# Patient Record
Sex: FEMALE | Race: OTHER | HISPANIC OR LATINO | URBAN - METROPOLITAN AREA
[De-identification: names, ages, dates, MRNs, and addresses within clinical notes are randomized per-mention and may not be internally consistent; named-entity substitution may affect disease eponyms.]

---

## 2021-07-15 ENCOUNTER — EMERGENCY (EMERGENCY)
Facility: HOSPITAL | Age: 36
LOS: 1 days | Discharge: HOME | End: 2021-07-15
Attending: EMERGENCY MEDICINE | Admitting: EMERGENCY MEDICINE
Payer: MEDICAID

## 2021-07-15 VITALS
RESPIRATION RATE: 19 BRPM | DIASTOLIC BLOOD PRESSURE: 81 MMHG | OXYGEN SATURATION: 97 % | HEART RATE: 85 BPM | TEMPERATURE: 98 F | SYSTOLIC BLOOD PRESSURE: 128 MMHG

## 2021-07-15 VITALS
RESPIRATION RATE: 18 BRPM | OXYGEN SATURATION: 99 % | SYSTOLIC BLOOD PRESSURE: 134 MMHG | HEART RATE: 90 BPM | DIASTOLIC BLOOD PRESSURE: 80 MMHG | WEIGHT: 139.99 LBS | TEMPERATURE: 98 F | HEIGHT: 61 IN

## 2021-07-15 PROCEDURE — 99284 EMERGENCY DEPT VISIT MOD MDM: CPT

## 2021-07-15 PROCEDURE — 73030 X-RAY EXAM OF SHOULDER: CPT

## 2021-07-15 PROCEDURE — 99283 EMERGENCY DEPT VISIT LOW MDM: CPT | Mod: 25

## 2021-07-15 PROCEDURE — 73030 X-RAY EXAM OF SHOULDER: CPT | Mod: 26,RT

## 2021-07-15 RX ORDER — IBUPROFEN 200 MG
600 TABLET ORAL ONCE
Refills: 0 | Status: COMPLETED | OUTPATIENT
Start: 2021-07-15 | End: 2021-07-15

## 2021-07-15 RX ADMIN — Medication 600 MILLIGRAM(S): at 18:07

## 2021-07-15 RX ADMIN — Medication 600 MILLIGRAM(S): at 18:48

## 2021-07-15 NOTE — ED ADULT NURSE NOTE - OBJECTIVE STATEMENT
Pt. received alert and oriented x3 with chief complaint of right shoulder pain after lifting box at work 1 day ago.

## 2021-07-15 NOTE — ED PROVIDER NOTE - ATTENDING CONTRIBUTION TO CARE
35 y/o F with c/o shoulder pain after lifting a heavy box of fries yesterday while at work    PE: unremarkable, FROM    xray, pain control 35 y/o F with c/o right shoulder pain after lifting a heavy box of fries yesterday while at work    PE: unremarkable, FROM    xray, pain control

## 2021-07-15 NOTE — ED PROVIDER NOTE - CARE PROVIDER_API CALL
Hal Rios  ORTHOPAEDIC SURGERY  [address] [place] [address]  Phone: [phone]  Fax: [phone]  Follow Up Time: 1-3 Days

## 2021-07-15 NOTE — ED PROVIDER NOTE - SKIN, MLM
Skin normal color for race, warm, dry and intact. No evidence of rash. no abrasions, lacerations or ecchymosis to right shoulder. cap refill < 2sec

## 2021-07-15 NOTE — ED PROVIDER NOTE - NSFOLLOWUPINSTRUCTIONS_ED_ALL_ED_FT
Esguince de hombro    LO QUE NECESITA SABER:    ¿Qué es un esguince de hombro?Se produce un esguince de hombro cuando se estira o se desgarra un ligamento del hombro. Los ligamentos son los tejidos resistentes que conectan los huesos. Los ligamentos permiten alzar, bajar y hacer girar el brazo.    Anatomía del hombro         ¿Cuáles son los signos y síntomas de un esguince de hombro?  •Moretones o cambios en el color de la piel      •Dolor y rigidez, sobre todo con el movimiento      •Inflamación y sensibilidad      ¿Cómo se diagnostica un esguince de hombro?Montiel médico le preguntará acerca de montiel lesión y lo examinará. Infórmele si usted escuchó un crujido o estallido cuando se lesionó. Montiel médico revisará el movimiento y la fuerza de montiel articulación. Es posible que le pida que usted mismo mueva la articulación. Es posible que también necesite alguno de los siguientes tratamientos:   •Dipesh radiografía, tomografía computarizada o resonancia magnéticapodrían mostrar el esguince u otros daños. Es posible que le den líquido de contraste para que montiel lesión se willy mejor en las imágenes. Dígale al médico si usted alguna vez ha tenido dipesh reacción alérgica al líquido de contraste. No entre a la [place] donde se realiza la resonancia magnética con algo de metal. El metal puede causar lesiones serias. Dígale al médico si usted tiene algo de metal dentro de montiel cuerpo o por encima.      •Dipesh artroscopiaes un procedimiento para philip dentro de la articulación. Montiel médico realiza dipesh incisión pequeña en montiel articulación e introduce un endoscopio a través de trenton. El endoscopio es un tubo marita con dipesh lupa, dipesh cámara y dipesh micah en un extremo.      ¿Cómo se trata un esguince de hombro?El tratamiento depende de la gravedad de montiel lesión y de cuándo ocurrió. Es posible que usted necesite alguno de los siguientes:  •Un cabestrillopodría ser necesario. Un cabestrillo limitará el movimiento del brazo y protegerá la articulación del hombro.   Cabestrillo para hombro           •Acetaminofénalivia el dolor y baja la fiebre. Está disponible sin receta médica. Pregunte la cantidad y la frecuencia con que debe tomarlos. Siga las indicaciones. Lino las etiquetas de todos los demás medicamentos que esté usando para saber si también contienen acetaminofén, o pregunte a montiel médico o farmacéutico. El acetaminofén puede causar daño en el hígado cuando no se gregg de forma correcta. No use más de 4 gramos (4000 miligramos) en total de acetaminofeno en un día.      •Los MADI,[place] el ibuprofeno, ayudan a disminuir la inflamación, el dolor y la fiebre. Kasandra medicamento está disponible con o sin dipesh receta médica. Los MADI pueden causar sangrado estomacal o problemas renales en ciertas personas. Si usted gregg un medicamento anticoagulante, siempre pregúntele a montiel médico si los MADI son seguros para usted. Siempre lino la etiqueta de kasandra medicamento y siga las instrucciones.      •Puede administrarsepodrían administrarse. Pregunte al médico cómo debe arielle kasandra medicamento de forma delgadillo. Algunos medicamentos recetados para el dolor contienen acetaminofén. No tome otros medicamentos que contengan acetaminofén sin consultarlo con montiel médico. Demasiado acetaminofeno puede causar daño al hígado. Los medicamentos recetados para el dolor podrían causar estreñimiento. Pregunte a montiel médico [place] prevenir o tratar estreñimiento.      •La fisioterapiapodría ser recomendado por montiel médico. Un fisioterapeuta le puede enseñar ejercicios para ayudarle a mejorar el movimiento y la fuerza, y para disminuir el dolor.      •La cirugíapodría ser necesaria para reparar o reemplazar un ligamento desgarrado si la torcedura no se recupera con otros tratamientos.      ¿Cómo puedo manejar el esguince de hombro?  •El descansosu hombro para que pueda sanar. Evite  el hombro mientras que montiel lesión mango. [place] ayudará a disminuir el riesgo de que se darron incluso más daño en el hombro.      •Aplique hieloen el hombro de 20 a 30 minutos cada 2 horas o [place] se lo indiquen. Use dipesh compresa de hielo o ponga hielo triturado en dipesh bolsa de plástico. Cúbrala con dipesh toalla antes de aplicarla sobre el hombro. El hielo ayuda a evitar daño al tejido y a disminuir la inflamación y el dolor.      •Compresiónsu hombro según indicaciones. La compresión (presión hugo) shraddha apoyo y contribuye a bajar la inflamación y limitar el movimiento para que montiel hombro pueda sanar.      ¿Cómo puedo evitar el esguince de hombro?  •No darron ejercicio cuando esté cansado o con dolor. Entre en calor y estírese antes de hacer ejercicio.      •Use equipo de protección cuando practique deportes.      •Use zapatos que le calcen dalia y corra sobre superficies che para no caerse.      ¿Cuándo ashlee buscar atención inmediata?  •Siente dolor intenso en el hombro cuando lo mueve o se lo toca.      •Montiel piel se siente fría o húmeda al tacto.      •Tiene entumecimiento, sensación de hormigueo o sensación de cosquilleo en el hombro.      •La piel del hombro lesionado se pone de color fauzia o pálida.      ¿Cuándo ashlee llamar a mi médico?  •Siente más dolor o se le hincha más el hombro, o comienza a tener dolor o hinchazón.      •Siente más rigidez o comienza a sentir rigidez cuando mueve el hombro lesionado.      •Anisha síntomas no mejoran dentro de 5 a 7 días.      •Usted tiene preguntas o inquietudes acerca de montiel condición o cuidado.      ACUERDOS SOBRE MONTIEL CUIDADO:    Usted tiene el derecho de ayudar a planear montiel cuidado. Aprenda todo lo que pueda sobre montiel condición y [place] darle tratamiento. Discuta anisha opciones de tratamiento con anisha médicos para decidir el cuidado que usted desea recibir. Usted siempre tiene el derecho de rechazar el tratamiento.

## 2021-07-15 NOTE — ED PROVIDER NOTE - MUSCULOSKELETAL MINIMAL EXAM
+ TTP right anterior shoulder with decreased ROm. no deformity. no TTP right elbow or wrist. radial pulses equal and intact bilaterally.

## 2021-07-15 NOTE — ED PROVIDER NOTE - PATIENT PORTAL LINK FT
You can access the FollowMyHealth Patient Portal offered by [facility] by registering at the following website: [email or web address]. By joining [facility]’s FollowMyHealth portal, you will also be able to view your health information using other applications (apps) compatible with our system.

## 2021-07-15 NOTE — ED ADULT NURSE NOTE - CADM POA CENTRAL LINE
----- Message from TIERRA Taylor sent at 4/24/2019  9:17 AM CDT -----  Urine culture grew E. coli. It is sensitive to the antibiotic that you're on. Continue and finish the entire course of Macrobid. No

## 2021-07-15 NOTE — ED PROVIDER NOTE - CLINICAL SUMMARY MEDICAL DECISION MAKING FREE TEXT BOX
37 yo female with no significant pmh presents to the ED c/o right shoulder pain since yesterday after carrying a heavy bag of french fries on her right shoulder. Pain constant, worse with movement. no fall to ground. Did not take any meds for pain. Denies fever, chills, chest pain, sob, abd pain, N/V, UE weakness or paresthesias. PE: as above,. A/P: xray neg for fx or dislocation. placed in sling, recommend RICE, NSAIDS, outpatient ortho follow.

## 2021-07-15 NOTE — ED PROVIDER NOTE - OBJECTIVE STATEMENT
35 yo female with no significant pmh presents to the ED c/o right shoulder pain since yesterday after carrying a heavy bag of french fries on her right shoulder. Pain constant, worse with movement. no fall to ground. Did not take any meds for pain. Denies fever, chills, chest pain, sob, abd pain, N/V, UE weakness or paresthesias.

## 2024-07-03 NOTE — ED PROCEDURE NOTE - NS ED PERI VASCULAR NEG
Physical Therapy      Patient Name:  Xena Vera   MRN:  [id number]    Patient not seen today secondary to [facility] for Dialysis @ 1019 and @1201 for second attempt.  Pt declined on third attempt @1319 secondary to fatigue. Will follow-up as appropriate per POC.     fingers/toes warm to touch/no paresthesia/no swelling/no cyanosis of extremity/capillary refill time < 2 seconds